# Patient Record
Sex: FEMALE | Race: WHITE
[De-identification: names, ages, dates, MRNs, and addresses within clinical notes are randomized per-mention and may not be internally consistent; named-entity substitution may affect disease eponyms.]

---

## 2017-06-27 ENCOUNTER — HOSPITAL ENCOUNTER (OUTPATIENT)
Dept: HOSPITAL 42 - ED | Age: 37
Setting detail: OBSERVATION
LOS: 2 days | Discharge: HOME | End: 2017-06-29
Attending: INTERNAL MEDICINE | Admitting: INTERNAL MEDICINE
Payer: MEDICAID

## 2017-06-27 VITALS — BODY MASS INDEX: 24 KG/M2

## 2017-06-27 DIAGNOSIS — I95.9: ICD-10-CM

## 2017-06-27 DIAGNOSIS — R42: ICD-10-CM

## 2017-06-27 DIAGNOSIS — I35.1: ICD-10-CM

## 2017-06-27 DIAGNOSIS — R55: ICD-10-CM

## 2017-06-27 DIAGNOSIS — Z87.891: ICD-10-CM

## 2017-06-27 DIAGNOSIS — G43.109: Primary | ICD-10-CM

## 2017-06-27 LAB
ALBUMIN/GLOB SERPL: 1.2 {RATIO} (ref 1.1–1.8)
ALP SERPL-CCNC: 58 U/L (ref 38–133)
ALT SERPL-CCNC: 29 U/L (ref 7–56)
APTT BLD: 27.3 SECONDS (ref 23.7–30.8)
AST SERPL-CCNC: 29 U/L (ref 15–39)
BILIRUB SERPL-MCNC: 0.5 MG/DL (ref 0.2–1.3)
BUN SERPL-MCNC: 17 MG/DL (ref 7–21)
CALCIUM SERPL-MCNC: 8.9 MG/DL (ref 8.4–10.5)
CHLORIDE SERPL-SCNC: 99 MMOL/L (ref 98–107)
CO2 SERPL-SCNC: 29 MMOL/L (ref 21–33)
ERYTHROCYTE [DISTWIDTH] IN BLOOD BY AUTOMATED COUNT: 12.7 % (ref 11.5–14.5)
GLOBULIN SER-MCNC: 3.3 GM/DL
GLUCOSE SERPL-MCNC: 89 MG/DL (ref 70–110)
HCT VFR BLD CALC: 38.4 % (ref 36–48)
INR PPP: 0.94 (ref 0.93–1.08)
MCH RBC QN AUTO: 31 PG (ref 25–35)
MCHC RBC AUTO-ENTMCNC: 33.9 G/DL (ref 31–37)
MCV RBC AUTO: 91.6 FL (ref 80–105)
PLATELET # BLD: 190 10^3/UL (ref 120–450)
PMV BLD AUTO: 11.2 FL (ref 7–11)
POTASSIUM SERPL-SCNC: 3.7 MMOL/L (ref 3.6–5)
PROT SERPL-MCNC: 7.5 G/DL (ref 5.8–8.3)
SODIUM SERPL-SCNC: 136 MMOL/L (ref 132–148)
TROPONIN I SERPL-MCNC: < 0.01 NG/ML
WBC # BLD AUTO: 8.2 10^3/UL (ref 4.5–11)

## 2017-06-27 PROCEDURE — 80048 BASIC METABOLIC PNL TOTAL CA: CPT

## 2017-06-27 PROCEDURE — 83735 ASSAY OF MAGNESIUM: CPT

## 2017-06-27 PROCEDURE — 80353 DRUG SCREENING COCAINE: CPT

## 2017-06-27 PROCEDURE — 93005 ELECTROCARDIOGRAM TRACING: CPT

## 2017-06-27 PROCEDURE — 82550 ASSAY OF CK (CPK): CPT

## 2017-06-27 PROCEDURE — 80349 CANNABINOIDS NATURAL: CPT

## 2017-06-27 PROCEDURE — 93306 TTE W/DOPPLER COMPLETE: CPT

## 2017-06-27 PROCEDURE — 83615 LACTATE (LD) (LDH) ENZYME: CPT

## 2017-06-27 PROCEDURE — 85610 PROTHROMBIN TIME: CPT

## 2017-06-27 PROCEDURE — 71010: CPT

## 2017-06-27 PROCEDURE — 85378 FIBRIN DEGRADE SEMIQUANT: CPT

## 2017-06-27 PROCEDURE — 81003 URINALYSIS AUTO W/O SCOPE: CPT

## 2017-06-27 PROCEDURE — 83992 ASSAY FOR PHENCYCLIDINE: CPT

## 2017-06-27 PROCEDURE — 71275 CT ANGIOGRAPHY CHEST: CPT

## 2017-06-27 PROCEDURE — 85730 THROMBOPLASTIN TIME PARTIAL: CPT

## 2017-06-27 PROCEDURE — 80345 DRUG SCREENING BARBITURATES: CPT

## 2017-06-27 PROCEDURE — 36415 COLL VENOUS BLD VENIPUNCTURE: CPT

## 2017-06-27 PROCEDURE — 84484 ASSAY OF TROPONIN QUANT: CPT

## 2017-06-27 PROCEDURE — 84703 CHORIONIC GONADOTROPIN ASSAY: CPT

## 2017-06-27 PROCEDURE — 80053 COMPREHEN METABOLIC PANEL: CPT

## 2017-06-27 PROCEDURE — 80346 BENZODIAZEPINES1-12: CPT

## 2017-06-27 PROCEDURE — 70450 CT HEAD/BRAIN W/O DYE: CPT

## 2017-06-27 PROCEDURE — 85027 COMPLETE CBC AUTOMATED: CPT

## 2017-06-27 PROCEDURE — 84443 ASSAY THYROID STIM HORMONE: CPT

## 2017-06-27 PROCEDURE — 80361 OPIATES 1 OR MORE: CPT

## 2017-06-27 PROCEDURE — 80324 DRUG SCREEN AMPHETAMINES 1/2: CPT

## 2017-06-27 PROCEDURE — 80358 DRUG SCREENING METHADONE: CPT

## 2017-06-27 PROCEDURE — 99285 EMERGENCY DEPT VISIT HI MDM: CPT

## 2017-06-27 PROCEDURE — 84100 ASSAY OF PHOSPHORUS: CPT

## 2017-06-27 NOTE — CT
EXAM:

  CT Head Without Intravenous Contrast



CLINICAL HISTORY:

  37 years old, female; Signs and symptoms; Dizziness; Additional info: Dizzy



TECHNIQUE:

  Axial computed tomography images of the head/brain without intravenous 

contrast.  This CT exam was performed using one or more of the following dose 

reduction techniques:  automated exposure control, adjustment of the mA and/or 

kV according to patient size, and/or use of iterative reconstruction technique.



COMPARISON:

  No relevant prior studies available.





FINDINGS:

  Brain: No acute intracranial hemorrhage.  No significant white matter 

disease.  No edema.

  Ventricles: No significant ventriculomegaly.

  Bones:  No acute displaced fracture.

  Sinuses:  Unremarkable as visualized.  No acute sinusitis.

  Mastoid air cells:  Unremarkable as visualized.  No mastoid effusion.



IMPRESSION:     

No acute intracranial hemorrhage, or suspicious mass effect.









Acute infarction may be CT occult within first 24 hours.  

If a focal deficit persists, consider followup CT or MRI for further 

evaluation.

## 2017-06-27 NOTE — ED PDOC
Arrival/HPI





- General


Time Seen by Provider: 17 21:28


Historian: Patient





- History of Present Illness


Narrative History of Present Illness (Text): 


17 21:41


Raquel Chauhan is a 37 year old female who presents to the ED complaining of 

intermittent dizziness,near fainting episodes. Patient states she began 

experiencing intermittent dizziness a few days prior while at work with 

associated near-syncopal episodes, left eye blurriness, generalized weakness, 

and head pressure. Patient notes she took Meclizine at home, but denies any 

relief. Patient denies any loss of consciousness, weakness/numbness, chest pain

, shortness of breath, nausea, diarrhea, back pain, neck pain, or any 

complaints.


Symptom Onset: Gradual


Symptom Course: Intermittent


Activities at Onset: Light


Context: Home





Past Medical History





- Provider Review


Nursing Documentation Reviewed: Yes





- Past History


Past History: No Previous





- Tetanus Immunization


Tetanus Immunization: Unknown





- Neurological


Hx Neurological Disorder: Yes


Hx Migraine: Yes





- Genitourinary/Gynecological


Hx Genitourinary Disorders: Yes


Hx Cervical Cancer: Yes (4-5 MOS AGO)


Other/Comment: multiparity





- Past Surgical History


Past Surgical History: No Previous





- Surgical History


Hx  Section: Yes


Hx Dilation and Curettage: Yes


Hx Tubal Ligation: Yes


Other/Comment: OVARIAN CYSTECTOMY, PARTIAL REMOVAL OF CERVIX DUE TO CA ?





- Anesthesia


Hx Anesthesia: Yes


Hx Anesthesia Reactions: No





- Suicidal Assessment


Feels Threatened In Home Enviroment: No





Family/Social History





- Physician Review


Nursing Documentation Reviewed: Yes


Family/Social History: Unknown Family HX


Smoking Status: Never Smoked


Hx Alcohol Use: No


Hx Substance Use Treatment: No





Allergies/Home Meds


Allergies/Adverse Reactions: 


Allergies





No Known Allergies Allergy (Verified 16 22:08)


 








Home Medications: 


 Home Meds











 Medication  Instructions  Recorded  Confirmed


 


No Known Home Med  13














Review of Systems





- Physician Review


All systems were reviewed & negative as marked: Yes





- Review of Systems


Constitutional: Other (+generalized weakness).  absent: Fevers


Eyes: Vision Changes (+left eye blurriness)


ENT: Normal


Respiratory: Normal.  absent: SOB, Cough


Cardiovascular: Other (+near-syncopal).  absent: Chest Pain


Gastrointestinal: absent: Abdominal Pain, Diarrhea, Nausea


Genitourinary Female: Normal.  absent: Dysuria, Frequency, Hematuria, Urine 

Output Changes


Musculoskeletal: Normal.  absent: Back Pain, Neck Pain


Skin: Normal


Neurological: Dizziness


Endocrine: Normal


Hemo/Lymphatic: Normal


Psychiatric: Normal





Physical Exam


Vital Signs Reviewed: Yes


Vital Signs











  Temp Pulse Resp BP Pulse Ox


 


 17 02:58   85  18  111/73  98


 


 17 00:40   80  18  104/62  99


 


 17 23:58  97.8 F  81  16  101/67  99


 


 17 21:38  98.4 F  78  18  106/69  98











Temperature: Afebrile


Blood Pressure: Normal


Pulse: Regular


Respiratory Rate: Normal


Appearance: Positive for: Well-Appearing, Non-Toxic, Comfortable


Pain Distress: None


Mental Status: Positive for: Alert and Oriented X 3





- Systems Exam


Head: Present: Atraumatic, Normocephalic


Pupils: Present: PERRL


Extroacular Muscles: Present: EOMI


Conjunctiva: Present: Normal


Ears: Present: Normal, NORMAL TM, Normal Canal.  No: Erythema, TM Bulging, Fluid

, TM Perf


Mouth: Present: Moist Mucous Membranes


Pharnyx: Present: Normal.  No: ERYTHEMA, EXUDATE, TONSILS ENLARGED, 

Peritonsilar Swelling, Uvular Deviation, Muffled/Hoarse Voice, Strider


Neck: Present: Normal Range of Motion.  No: Meningeal Signs, MIDLINE TENDERNESS

, Paraspinal Tenderness


Respiratory/Chest: Present: Clear to Auscultation, Good Air Exchange.  No: 

Respiratory Distress, Accessory Muscle Use


Cardiovascular: Present: Regular Rate and Rhythm, Normal S1, S2.  No: Murmurs


Abdomen: Present: Normal Bowel Sounds.  No: Tenderness, Distention, Peritoneal 

Signs


Back: Present: Normal Inspection.  No: CVA Tenderness, Midline Tenderness, 

Paraspinal Tenderness


Upper Extremity: Present: Normal Inspection.  No: Cyanosis, Edema


Lower Extremity: Present: Normal Inspection.  No: Edema


Neurological: Present: GCS=15, CN II-XII Intact, Speech Normal, Motor Func 

Grossly Intact, Normal Sensory Function, Normal Cerebellar Funct, Memory Normal


Skin: Present: Warm, Dry, Normal Color.  No: Rashes


Psychiatric: Present: Alert, Oriented x 3, Normal Insight, Normal Concentration





Medical Decision Making


ED Course and Treatment: 


17 21:41


Impression:


37 year old female c/o dizziness, near-syncope, and generalized weakness.





Differential Diagnosis included but are not limited to:  near-syncope





Plan:


-- CT Head w/o contrast


-- EKG


-- CXR


-- Labs, cardiac enzymes


-- Urinalysis, urine drug screen


-- IV fluids


-- Reassess and disposition





Progress Notes:


17 22:03


Reviewed EKG, NSR at 69 bpm. No ST-segment elevations or depressions, no T-wave 

inversions, normal intervals.





17 23:42


Reviewed radiology, CT Head shows: No acute intracranial hemorrhage, or 

suspicious mass effect.


CXR shows no acute processes.





17 02:21


Case discussed with medical resident on call, who is aware and agrees with 

plan. House physician notified.





17 02:23


Case discussed with Dr. Swenson, who is aware and agrees with plan. Accepts pt in 

to hospitalist service. Pt will go to remote telemetry observation for near-

syncope and intractable dizziness. 


Pt is no acute distress. Discussed results and hospital observation with pt, 

who is aware and verbalizes understanding.





- Lab Interpretations


Lab Results: 








 17 22:50 





 17 22:50 





 Lab Results





17 23:47: Urine Opiates Screen Negative, Urine Methadone Screen Negative, 

Ur Barbiturates Screen Negative, Ur Phencyclidine Scrn Negative, Ur 

Amphetamines Screen Negative, U Benzodiazepines Scrn Negative, U Oth Cocaine 

Metabols Negative, U Cannabinoids Screen Negative


17 23:41: Urine Color Yellow, Urine Appearance Clear, Urine pH 7.0, Ur 

Specific Gravity 1.015, Urine Protein Negative, Urine Glucose (UA) Negative, 

Urine Ketones Negative, Urine Blood Negative, Urine Nitrate Negative, Urine 

Bilirubin Negative, Urine Urobilinogen 1.0 H, Ur Leukocyte Esterase Negative, 

Urine HCG, Qual Negative


17 22:50: Phosphorus 4.1, Magnesium 2.0


17 22:50: WBC 8.2, RBC 4.19, Hgb 13.0, Hct 38.4, MCV 91.6, MCH 31.0, MCHC 

33.9, RDW 12.7, Plt Count 190, MPV 11.2 H


17 22:50: PT 10.2, INR 0.94, APTT 27.3


17 22:50: Sodium 136, Potassium 3.7, Chloride 99, Carbon Dioxide 29, 

Anion Gap 12, BUN 17, Creatinine 0.8, Est GFR (African Amer) > 60, Est GFR (Non-

Af Amer) > 60, Random Glucose 89, Calcium 8.9, Total Bilirubin 0.5, AST 29, ALT 

29, Alkaline Phosphatase 58, Lactate Dehydrogenase 429, Total Creatine Kinase 68

, Troponin I < 0.01, Total Protein 7.5, Albumin 4.1, Globulin 3.3, Albumin/

Globulin Ratio 1.2








I have reviewed the lab results: Yes





- RAD Interpretation


Narrative RAD Interpretations (Text): 


CT Head shows:


Brain: No acute intracranial hemorrhage. No significant white matter disease. 

No edema.


Ventricles: No significant ventriculomegaly.


Bones: No acute displaced fracture.


Sinuses: Unremarkable as visualized. No acute sinusitis.


Mastoid air cells: Unremarkable as visualized. No mastoid effusion.


IMPRESSION:


No acute intracranial hemorrhage, or suspicious mass effect.





CXR shows no acute processes.


Radiology Orders: 








17 21:46


HEAD W/O CONTRAST [CT] Stat 





17 21:47


CHEST PORTABLE [RAD] Stat 











: ED Physician, Radiologist





- EKG Interpretation


Interpreted by ED Physician: Yes


Type: 12 lead EKG





- Medication Orders


Current Medication Orders: 








Acetaminophen (Tylenol 325mg Tab)  650 mg PO Q6H PRN


   PRN Reason: Fever >100.4 F


Acetaminophen/Butalbital/Caffeine (Fioricet)  1 tab PO Q4H PRN


   PRN Reason: Headache


Albuterol Sulfate (Albuterol 0.083% Inhal Sol (2.5 Mg/3 Ml) Ud)  2.5 mg IH Q2H 

PRN


   PRN Reason: Shortness of Breath


Famotidine (Pepcid)  20 mg PO BID [facility]


   Last Admin: 17 11:41  Dose: 20 mg





Sodium Chloride (Sodium Chloride 0.9%)  1,000 mls @ 150 mls/hr IV .Q6H40M [facility]


Ibuprofen (Motrin Tab)  400 mg PO Q6H PRN


   PRN Reason: Pain, Mild (1-3)


Meclizine HCl (Antivert)  25 mg PO Q8H PRN


   PRN Reason: Dizziness


Ondansetron HCl (Zofran Inj)  4 mg IVP Q6H PRN


   PRN Reason: Nausea/Vomiting





Discontinued Medications





Acetaminophen/Butalbital/Caffeine (Fioricet)  1 tab PO ONCE [facility]


   Stop: 17 02:47


   Last Admin: 17 02:58  Dose: 1 tab





Re-Assess: MAR Pain Assessment


 Document     17 03:58  RR  (Rec: 17 04:39  RR  [id number])


     Pain Reassessment


      Is this a pain reassessment?               Yes


     Sleep


      Is patient sleeping during reassessment?   Yes


     Pain Scale Used


      Pain Scale Used                            FLACC





Sodium Chloride (Sodium Chloride 0.9%)  1,000 mls @ 100 mls/hr IV .Q10H [facility]


   Last Admin: 17 22:53  Dose: 100 mls/hr





Iohexol (Omnipaque 350 150 Ml) Confirm Administered Dose 150 ml .ROUTE .STK-MED 

ONE


   Stop: 17 17:56


Ketorolac Tromethamine (Toradol)  30 mg IVP STAT STA


   Stop: 17 10:14











- Scribe Statement


The provider has reviewed the documentation as recorded by the Yael Millard


Provider Attestation: 


All medical record entries made by the Yael were at my direction and 

personally dictated by me. I have reviewed the chart and agree that the record 

accurately reflects my personal performance of the history, physical exam, 

medical decision making, and the department course for this patient. I have 

also personally directed, reviewed, and agree with the discharge instructions 

and disposition.





Disposition/Present on Arrival





- Present on Arrival


Any Indicators Present on Arrival: No


History of DVT/PE: No


History of Uncontrolled Diabetes: No


Urinary Catheter: No


History Surgical Site Infection Following: None





- Disposition


Have Diagnosis and Disposition been Completed?: Yes


Diagnosis: 


 Near syncope, Dizziness





Disposition: HOSPITALIZED


Disposition Time: 


Patient Plan: Observation


Patient Problems: 


 Current Active Problems











Problem Status Onset


 


Dizziness Acute  


 


Near syncope Acute  











Condition: STABLE

## 2017-06-28 VITALS — RESPIRATION RATE: 18 BRPM

## 2017-06-28 LAB
APPEARANCE UR: CLEAR
BILIRUB UR-MCNC: NEGATIVE MG/DL
BUN SERPL-MCNC: 13 MG/DL (ref 7–21)
CALCIUM SERPL-MCNC: 7.9 MG/DL (ref 8.4–10.5)
CHLORIDE SERPL-SCNC: 107 MMOL/L (ref 98–107)
CO2 SERPL-SCNC: 24 MMOL/L (ref 21–33)
COLOR UR: YELLOW
ERYTHROCYTE [DISTWIDTH] IN BLOOD BY AUTOMATED COUNT: 12.8 % (ref 11.5–14.5)
GLUCOSE SERPL-MCNC: 95 MG/DL (ref 70–110)
GLUCOSE UR STRIP-MCNC: NEGATIVE MG/DL
HCT VFR BLD CALC: 34.1 % (ref 36–48)
KETONES UR STRIP-MCNC: NEGATIVE MG/DL
LEUKOCYTE ESTERASE UR-ACNC: NEGATIVE LEU/UL
MAGNESIUM SERPL-MCNC: 2 MG/DL (ref 1.7–2.2)
MCH RBC QN AUTO: 30.3 PG (ref 25–35)
MCHC RBC AUTO-ENTMCNC: 33.4 G/DL (ref 31–37)
MCV RBC AUTO: 90.7 FL (ref 80–105)
PH UR STRIP: 7 [PH] (ref 4.7–8)
PHOSPHATE SERPL-MCNC: 4.1 MG/DL (ref 2.5–4.5)
PLATELET # BLD: 157 10^3/UL (ref 120–450)
PMV BLD AUTO: 11.4 FL (ref 7–11)
POTASSIUM SERPL-SCNC: 4 MMOL/L (ref 3.6–5)
PROT UR STRIP-MCNC: NEGATIVE MG/DL
RBC # UR STRIP: NEGATIVE /UL
SODIUM SERPL-SCNC: 136 MMOL/L (ref 132–148)
SP GR UR STRIP: 1.01 (ref 1–1.03)
UROBILINOGEN UR STRIP-ACNC: 1 E.U./DL
WBC # BLD AUTO: 5.5 10^3/UL (ref 4.5–11)

## 2017-06-28 RX ADMIN — BUTALBITAL, ACETAMINOPHEN, AND CAFFEINE STA TAB: 50; 325; 40 TABLET ORAL at 02:58

## 2017-06-28 RX ADMIN — BUTALBITAL, ACETAMINOPHEN, AND CAFFEINE STA: 50; 325; 40 TABLET ORAL at 03:10

## 2017-06-28 NOTE — CARD
--------------- APPROVED REPORT --------------





EKG Measurement

Heart [id number]

[id number]

[id number]

[id number]

[id number]



<Conclusion>

Normal sinus rhythm

Normal ECG

## 2017-06-28 NOTE — CP.PCM.CON
History of Present Illness





- History of Present Illness


History of Present Illness: 


NEURO CONSULT NOTE:





17











CHIEF COMPLAINT: HEADACHE AND DIZZINESS





HPI:


THIS IS A 37 YEAR OLD WOMAN WITH HISTORY OF HEADACHES IN THE PAST AND SYNCOPAL 

EVENT WHERE SHE HAD A MRI OF BRAIN, EEG WITH HER NEUROLOGIST DR. REYNOLDS WHICH WAS 

NORMAL. SHE PRESENTS WITH DIFFUSE PRESSURE HEADACHE ASSOCIATED WITH PHOTOPHOBIA

, PHOTOPHONIA, NAUSEA WITH MILD SPINNING SENSATION OF THE ROOM AND 

LIGHTHEADEDNESS. SHE GETS BARELY 1-2 SEVERE HEADACHES PER MONTH. HER BP WAS 

NOTED TO BE SYSTOLICALLY AND DIASTOLICALLY LOW. CT HEAD WAS NEGATIVE. CURRENTLY 

HER HEADACHES ARE MUCH BETTER AND IS WALKING AROUND WITHOUT ISSUES. 





ROS: 14 POINT REVIEW OF SYMPTOMS IS NEGATIVE AS PER HPI.





ALLERGIES: NONE





SOCIAL HISTORY: NO ILLICIT DRUG USE, SMOKING, OR ETOH USE. 





FAMILY: NON CONTRIBUTORY.





MEDICATIONS: REVIEWED BY NURSE'S RECONCILIATION SHEET.





PAST MEDICAL HISTORY: HEADACHES








PHYSICAL EXAM: 





VITAL SIGNS: REVIEWED BY THE CHART





GENERAL EXAM: PATIENT SEEN IN BED, IN NO ACUTE DISTRESS MORBIDLY OBESE.





HEENT: PERRLA, EOMI, NECK SUPPLE, NO JVD, NO ADENOPATHY





CVS: S1, S2, RRR, NO MURMURS NOTED





LUNGS: CLEAR TO AUSCULTATION, NO ADVENTITIOUS SOUNDS





ABDOMEN: SOFT AND NONTENDER





EXTREMITIES: NO CLUBBING OR CYANOSIS. PP 2+ B/L





NEURO: PT IS ALERT AND ORIENTED TO PERSON, PLACE, AND YEAR. , RECALL TO 5 

MINUTES 3/3, 





SPEECH IS FLUENT WITHOUT ERRORS, 





CN II-XII INTACT, 





MOTOR EXAM: NORMAL TONE, NORMAL BULK OF MUSCLE, MOVES ALL EXTREMITIES EQUALLY, 

NO PRONATOR DRIFT SEEN.





SENSORY EXAM: LIGHT TOUCH, PIN PRICK UP TO CALVES B/L, PROPRIOCEPTION , 

VIBRATION ARE INTACT B/L





DEEP TENDON REFLEXES: 2+ THROUGHOUT.





COORDINATION: FINGER TO NOSE IS INTACT. HEEL TO GARCIA IS INTACT





GAIT: NORMAL.











LABS: REVIEWED BY THE CHART.











ASSESSMENT AND PLAN: 





THIS IS A 37 YEAR OLD WOMAN WITH HISTORY OF HEADACHES IN THE PAST AND SYNCOPAL 

EVENT WHERE SHE HAD A MRI OF BRAIN, EEG WITH HER NEUROLOGIST DR. REYNOLDS WHICH WAS 

NORMAL. SHE PRESENTS WITH DIFFUSE PRESSURE HEADACHE ASSOCIATED WITH PHOTOPHOBIA

, PHOTOPHONIA, NAUSEA WITH MILD SPINNING SENSATION OF THE ROOM AND 

LIGHTHEADEDNESS. SHE GETS BARELY 1-2 SEVERE HEADACHES PER MONTH. HER BP WAS 

NOTED TO BE SYSTOLICALLY AND DIASTOLICALLY LOW. CT HEAD WAS NEGATIVE. CURRENTLY 

HER HEADACHES ARE MUCH BETTER AND IS WALKING AROUND WITHOUT ISSUES. 


IMPRESSION: CONSTELLATION OF SYMPTOMS ARE MORE OF A MIGRAINE WITH AURA. 

DIZZINESS IS MORE RELATED TO LOW BP


1.. ASA 81 MG FOR STROKE PREVENTION





2. MONITOR ELECTROLYTES AND CORRECT ACCORDINGLY. HYDRATE.





3. FIORECET AT THE ACUTE ONSET OF HEADACHE AND FOLLOW UP WITH NEUROLOGIST AS 

OUTPATIENT. 





THANK YOU





PLEASE RECONSULT AS NECESSARY.





ISAAC WAKEFIELD MD





Past Patient History





- Tetanus Immunizations


Tetanus Immunization: Unknown





- Past Medical History & Family History


Past Medical History?: No





- Past Social History


Smoking Status: Former Smoker





- NEUROLOGICAL


Hx Neurological Disorder: Yes


Hx Dizziness: Yes


Hx Migraine: Yes





- MUSCULOSKELETAL/RHEUMATOLOGICAL


Hx Falls: No





- GENITOURINARY/GYNECOLOGICAL


Hx Genitourinary Disorders: Yes


Other/Comment: multiparity.  





- SURGICAL HISTORY


Other/Comment: OVARIAN CYSTECTOMY, PARTIAL REMOVAL OF CERVIX DUE TO CA ?





- ANESTHESIA


Hx Anesthesia: Yes


Hx Anesthesia Reactions: No





Meds


Allergies/Adverse Reactions: 


 Allergies











Allergy/AdvReac Type Severity Reaction Status Date / Time


 


No Known Allergies Allergy   Verified 16 22:08














- Medications


Medications: 


 Current Medications





Acetaminophen (Tylenol 325mg Tab)  650 mg PO Q6H PRN


   PRN Reason: Fever >100.4 F


Acetaminophen/Butalbital/Caffeine (Fioricet)  1 tab PO Q4H PRN


   PRN Reason: Headache


Albuterol Sulfate (Albuterol 0.083% Inhal Sol (2.5 Mg/3 Ml) Ud)  2.5 mg IH Q2H 

PRN


   PRN Reason: Shortness of Breath


Famotidine (Pepcid)  20 mg PO BID [facility]


   Last Admin: 17 11:41 Dose:  20 mg


Sodium Chloride (Sodium Chloride 0.9%)  1,000 mls @ 150 mls/hr IV .Q6H40M [facility]


Ibuprofen (Motrin Tab)  400 mg PO Q6H PRN


   PRN Reason: Pain, Mild (1-3)


Meclizine HCl (Antivert)  25 mg PO Q8H PRN


   PRN Reason: Dizziness


Ondansetron HCl (Zofran Inj)  4 mg IVP Q6H PRN


   PRN Reason: Nausea/Vomiting











Results





- Vital Signs


Recent Vital Signs: 


 Last Vital Signs











Temp  98.4 F   17 16:00


 


Pulse  84   17 16:00


 


Resp  18   17 16:00


 


BP  110/73   17 16:00


 


Pulse Ox  99   17 16:00














- Labs


Result Diagrams: 


 17 09:30





 17 09:30


Labs: 


 Laboratory Results - last 24 hr











  17





  09:30 09:30 12:30


 


WBC  5.5  D  


 


RBC  3.76  


 


Hgb  11.4 L  


 


Hct  34.1 L  


 


MCV  90.7  


 


MCH  30.3  


 


MCHC  33.4  


 


RDW  12.8  


 


Plt Count  157  


 


MPV  11.4 H  


 


D-Dimer, Quantitative   


 


Sodium   136 


 


Potassium   4.0 


 


Chloride   107 


 


Carbon Dioxide   24 


 


Anion Gap   9 L 


 


BUN   13 


 


Creatinine   0.7 


 


Est GFR ( Amer)   > 60 


 


Est GFR (Non-Af Amer)   > 60 


 


Random Glucose   95 


 


Calcium   7.9 L 


 


TSH 3rd Generation    2.09














  17





  15:03


 


WBC 


 


RBC 


 


Hgb 


 


Hct 


 


MCV 


 


MCH 


 


MCHC 


 


RDW 


 


Plt Count 


 


MPV 


 


D-Dimer, Quantitative  0.56 H


 


Sodium 


 


Potassium 


 


Chloride 


 


Carbon Dioxide 


 


Anion Gap 


 


BUN 


 


Creatinine 


 


Est GFR ( Amer) 


 


Est GFR (Non-Af Amer) 


 


Random Glucose 


 


Calcium 


 


TSH 3rd Generation

## 2017-06-28 NOTE — RAD
HISTORY:

dizzy  



COMPARISON:

No prior. 



FINDINGS:



LUNGS:

No active pulmonary disease.



PLEURA:

No significant pleural effusion identified, no pneumothorax apparent.



CARDIOVASCULAR:

Normal.



OSSEOUS STRUCTURES:

No significant abnormalities.



VISUALIZED UPPER ABDOMEN:

Normal.



OTHER FINDINGS:

None.



IMPRESSION:

No active disease.

## 2017-06-28 NOTE — CP.PCM.HP
<LASHANDAAshok stevens - Last Filed: 17 02:51>





History of Present Illness





- History of Present Illness


History of Present Illness: 


CC: Headache and dizziness





This patient is a 38yo F w/ no PMhx, on no medications, who is presenting to 

the ED for intermittent dizziness associated with headache, nausea, and 

photophobia. The patient states this has happened to her before, and her PMD 

prescribed her meclizine, which helped initially along with some ibuprofen. 

During the past week, the meclizine did not help her as much. Last year, the 

patient had a full neurological workup, including brain MRI, EEG, and EMG which 

was completely negative as per the patient. She has vomited once, on Friday, 

with the dizziness. She does not qualify it as blurry vision, or room spinning 

but states that her left sided vision sometimes gets a little "blurry and she 

gets a headache that is pounding". She currently denies all symptoms, of HA, CP

, SOB, abdominal pain, n/V/d, dysuria/freq/urg, or lower extremity pain, gait 

problems, and was comfortably sleeping in the bed when i went to examine her





PMhx: none


Meds: none


Surgeries: tubal ligation and DnC; used to have heavy periods and when gave 

birth to son they found a large leiomyoma and removed it 


FamHx: States both side of her family have all of the cancers, Colon, Breast, 

Ovarian, Uterine, and Lung all at 65+ years of age


Social: Employed full time, hairdresser, denies current smoking/drugs/EtOH 

socially, has 3 children, independent in all activities, walks without issue 

with no cane and has housing 


Allergies: Denies





Present on Admission





- Present on Admission


Any Indicators Present on Admission: No


History of DVT/PE: No


History of Uncontrolled Diabetes: No


Urinary Catheter: No


Decubitus Ulcer Present: No





Past Patient History





- Tetanus Immunizations


Tetanus Immunization: Unknown





- Past Medical History & Family History


Past Medical History?: No





- Past Social History


Smoking Status: Never Smoked





- NEUROLOGICAL


Hx Neurological Disorder: Yes


Hx Migraine: Yes





- GENITOURINARY/GYNECOLOGICAL


Hx Genitourinary Disorders: Yes


Hx Cervical Cancer: Yes (4-5 MOS AGO)


Other/Comment: multiparity





- SURGICAL HISTORY


Hx  Section: Yes


Hx Dilation and Curettage: Yes


Hx Tubal Ligation: Yes


Other/Comment: OVARIAN CYSTECTOMY, PARTIAL REMOVAL OF CERVIX DUE TO CA ?





- ANESTHESIA


Hx Anesthesia: Yes


Hx Anesthesia Reactions: No





Meds


Allergies/Adverse Reactions: 


 Allergies











Allergy/AdvReac Type Severity Reaction Status Date / Time


 


No Known Allergies Allergy   Verified 16 22:08














Physical Exam





- Constitutional


Appears: Well, Non-toxic


Additional comments: 


well groomed pleasant female responding appropriately to questions, resting 

comfortably in bed





- Head Exam


Head Exam: ATRAUMATIC





- Eye Exam


Eye Exam: EOMI, Normal appearance


Pupil Exam: PERRL





- ENT Exam


ENT Exam: Mucous Membranes Moist.  absent: Mucous Membranes Dry





- Neck Exam


Neck exam: Positive for: Full Rom, Normal Inspection.  Negative for: 

Lymphadenopathy, Meningismus, Tenderness, Thyromegaly





- Respiratory Exam


Respiratory Exam: Clear to Auscultation Bilateral, NORMAL BREATHING PATTERN.  

absent: Rales, Rhonchi, Wheezes





- Cardiovascular Exam


Cardiovascular Exam: REGULAR RHYTHM, RRR, +S1, +S2.  absent: Bradycardia, 

Tachycardia, Clicks, Diastolic murmur, Gallop, Irregular Rhythm, JVD, Rubs, +S4

, Systolic Murmur





- GI/Abdominal Exam


GI & Abdominal Exam: Normal Bowel Sounds, Soft.  absent: Bruit, Diminished 

Bowel Sounds, Distended, Firm, Guarding, Hyperactive Bowel Sounds, Hypoactive 

Bowel Sounds, Mass, Organomegaly, Pulsatile Mass, Rebound, Tenderness





- Rectal Exam


Rectal Exam: Deferred





- Extremities Exam


Extremities exam: Positive for: full ROM, normal capillary refill, normal 

inspection, pedal pulses present.  Negative for: calf tenderness, joint swelling

, pedal edema, tenderness





- Back Exam


Back exam: FULL ROM, NORMAL INSPECTION.  absent: CVA tenderness (L), CVA 

tenderness (R), muscle spasm, paraspinal tenderness, rash noted, tenderness, 

vertebral tenderness





- Neurological Exam


Neurological exam: Alert, CN II-XII Intact, Normal Gait, Oriented x3, Reflexes 

Normal


Additional comments: 


Neuro exam is completely normal, all cranial nerves intact, no nystagmus





- Psychiatric Exam


Psychiatric exam: Normal Affect, Normal Mood





Results





- Vital Signs


Recent Vital Signs: 





 Last Vital Signs











Temp  97.8 F   17 23:58


 


Pulse  80   17 00:40


 


Resp  18   17 00:40


 


BP  104/62   17 00:40


 


Pulse Ox  99   06/28/17 00:40














- Labs


Result Diagrams: 


 17 22:50





 17 22:50





Assessment & Plan





- Assessment and Plan (Free Text)


Assessment: 


38yo F admitted for possible migraine and near syncope 





Possible Migraine and near syncope 


-Fiorcet PRN and Meclizine; patient was given no analgesia in the ED and no 

antinausea/anti vertigo medicines in the ED


-head CT negative


-Chest X-Ray Normal


-EKG NSR


-Echo ordered, f/u results


-patient has a reported neuro workup within the past year that was completely 

negative EEG, MRI brain, and EMG studies, currently has a completely steady gait

, is resting comfortably in her room, and is responding appropriately to 

questions 


-orthostatics were negative; pt is s/p 1L NS in the ED 





Proph


Pepcid


Regular Diet


OOB encouraged





Case discussed with Dr. Messi Jonas PGY1 Night Float








Decision To Admit





- Pt Status Changed To:


Hospital Disposition Of: Observation





- .


Bed Request Type: Med/Surg


Admitting Physician: All Swenson





<All Swenson - Last Filed: 17 05:07>





Results





- Vital Signs


Recent Vital Signs: 





 Last Vital Signs











Temp  98.0 F   17 04:08


 


Pulse  65   17 04:08


 


Resp  16   17 04:08


 


BP  100/60   17 04:08


 


Pulse Ox  98   17 02:58














- Labs


Result Diagrams: 


 17 22:50





 17 22:50





Attending/Attestation





- Attestation


I have personally seen and examined this patient.: Yes


I have fully participated in the care of the patient.: Yes


I have reviewed all pertinent clinical information: Yes


Notes (Text): 





17 05:06


Patient was seen when she was in bed # 367-02.


Agree with history , physical , assessment and plan with following addendum.





37 year old woman with past medical history of no significance, felt dizzy


at her job as costmatologist, on Thursday morning, felt blurry


in left eye, felt like she was going to pass out ,felt weak, vomited once,


felt nauseated, felt drowsy and sleepy, had head ache , mostly in face ,


side of face and neck, mild headache, she had similar symptoms one year


ago when she went to [facility] she was told that she had


a mini stroke, had vertigo and was given a prescription for meclizine.





ALLERGIES:Denies.





PMH:Denies.





PAST SURGICAL HISTORY: + bilateral tubal ligation - 4 years ago.


                      Excison of ovarian cyst , biopsy revealed "close 


                      to cancer"


OB-GYN History-, one miscarriage, LMP6/15/2017.





SOCIAL HISTORY:Smoking-quit 10 years ago, smoked 1 pack per 2 weeks x 1 year (

Summer)


               ETOH-Occ.


               Drugs-Never.


FH:Many members with DM.


   Maternal Uncle-Prostate cancer.


   One cousin-Uterine cancer.





ROS:When she was 5-6 years old had an episode of passing out and had 


    dizzines, mother told her that she had food allergy.


    States that she gets nausea with some food and can't take them.


    Had injury to left ear about one year ago [place]


    could not hear well for some time.


    Has eye glasses for far vision.


    History of ear infection x 2.


    Seasonal allergies positive.


    Had anemia "Growing UP"


    Has migraines sometimes.


    History of anxiety is positive.

## 2017-06-28 NOTE — CT
PROCEDURE:  CT Chest with contrast (Pulmonary Angiogram)



HISTORY:

Rule out Pulmonary embolism



COMPARISON:

None available. 



TECHNIQUE:

Axial computed tomography images were obtained of the chest in the 

pulmonary arterial phase of enhancement. Coronal and sagittal 

reformatted images were created and reviewed.



Intravenous contrast dose: 



Radiation dose:



Total exam DLP = 325 mGy-cm.



This CT exam was performed using one or more of the following dose 

reduction techniques: Automated exposure control, adjustment of the 

mA and/or kV according to patient size, and/or use of iterative 

reconstruction technique.



Total exam DLP = 325 mGy-cm.



FINDINGS:



PULMONARY ARTERIES:

Unremarkable. No pulmonary embolism. 



AORTA:

No acute findings. No thoracic aortic aneurysm. 



LUNGS:

Unremarkable. No nodule, mass or pulmonary consolidation. 



PLEURAL SPACES:

Unremarkable. No effusion or pneuomothorax. 



HEART:

Unremarkable. No cardiomegaly. No significant pericardial effusion. 



LYMPH NODES:

No lymphadenopathy.



BONES, CHEST WALL:

Unremarkable. No fracture or destructive lesion 



OTHER FINDINGS:

Unremarkable. 



IMPRESSION:

Unremarkable CT pulmonary angiogram. No pulmonary embolus.

## 2017-06-28 NOTE — CARD
--------------- APPROVED REPORT --------------





EXAM: Two-dimensional and M-mode echocardiogram with Doppler and 

color Doppler.



INDICATION

Syncope 



2D DIMENSIONS 

Left Atrium (2D)3.0   (1.6-4.0cm)IVSd0.9   (0.7-1.1cm)

LVDd4.7   (3.9-5.9cm)PWd0.9   (0.7-1.1cm)

LVDs3.5   (2.5-4.0cm)FS (%) 25.9   %

LVEF (%)51.0   (>50%)



M-Mode DIMENSIONS 

Aortic Root2.70   (2.2-3.7cm)Aortic Cusp Exc.1.80   (1.5-2.0cm)



Aortic Valve

AoV Peak Ekfdkoui32.1cm/Sue Peak GR.4mmHg



Mitral Valve

MV E Gawjmepp73.4cm/sMV A Irfubfle43.6cm/sE/A ratio1.4



TDI

E/Lateral E'0.0E/Medial E'0.0



Tricuspid Valve

TR Peak Lvkayfcq809rg/sRAP UZKYJLYD32otFyCP Peak Gr.18mmHg

[id number]



 LEFT VENTRICLE 

The left ventricle is normal size. There is normal left ventricular 

wall thickness. Left ventricle systolic function is low normalto 

Mildlly decreased.EF-50-55% There is mild hypokinesis in the apical 

anterior wall. The left ventricular diastolic function is normal. No 

left ventricle thrombus noted on this study. There is no ventricular 

septal defect visualized. There is no left ventricular aneurysm. 

There is no mass noted in the left ventricle.



 RIGHT VENTRICLE 

The right ventricle is normal size. There is normal right ventricular 

wall thickness. The right ventricular systolic function is normal.



 ATRIA 

The left atrium size is normal. The right atrium size is normal. The 

interatrial septum is intact with no evidence for an atrial septal 

defect. The atrial septum is aneurysmal.



 AORTIC VALVE 

The aortic valve is calcified but opens well. There is trace to mild 

aortic regurgitation. There is no aortic valvular stenosis. There is 

no aortic valvular vegetation.



 MITRAL VALVE 

The mitral valve is thickened but opens well. Mitral regurgitation is 

mild to moderate. There is no mitral valve stenosis. There is no 

evidence of mitral valve prolapse.



 TRICUSPID VALVE 

The tricuspid valve leaflets are thickened , but open well. There is 

mild to moderate tricuspid regurgitation.RVSP-28 mmof hg. There is no 

tricuspid valve stenosis. There is no tricuspid valve prolapse or 

vegetation.



 PULMONIC VALVE 

The pulmonic valve is not well visualized. There is trace pulmonic 

valvular regurgitation. There is no pulmonic valvular stenosis.



 GREAT VESSELS 

The aortic root is normal in size. The ascending aorta is normal in 

size. The pulmonary artery is normal. The IVC is normal in size and 

collapses >50% with inspiration.



 PERICARDIAL EFFUSION 

There is no pleural effusion. There is no pericardial effusion.



<Conclusion>

The left ventricle is normal size.

There is normal left ventricular wall thickness.

Left ventricle systolic function is low normalto Mildlly 

decreased.EF-50-55%

There is trace to mild aortic regurgitation.

Mitral regurgitation is mild to moderate.

There is mild to moderate tricuspid regurgitation.RVSP-28 mmof hg.

The IVC is normal in size and collapses >50% with inspiration.

There is no pericardial effusion.

No Vegetation or thrombus noted.

The interatrial septum is intact with no evidence for an atrial 

septal defect.

The atrial septum is aneurysmal.

## 2017-06-29 VITALS — HEART RATE: 90 BPM

## 2017-06-29 VITALS — SYSTOLIC BLOOD PRESSURE: 99 MMHG | OXYGEN SATURATION: 97 % | DIASTOLIC BLOOD PRESSURE: 60 MMHG | TEMPERATURE: 97.7 F

## 2017-06-29 LAB
BUN SERPL-MCNC: 11 MG/DL (ref 7–21)
CALCIUM SERPL-MCNC: 8.4 MG/DL (ref 8.4–10.5)
CHLORIDE SERPL-SCNC: 105 MMOL/L (ref 98–107)
CO2 SERPL-SCNC: 26 MMOL/L (ref 21–33)
ERYTHROCYTE [DISTWIDTH] IN BLOOD BY AUTOMATED COUNT: 12.8 % (ref 11.5–14.5)
GLUCOSE SERPL-MCNC: 95 MG/DL (ref 70–110)
HCT VFR BLD CALC: 37 % (ref 36–48)
MCH RBC QN AUTO: 30.1 PG (ref 25–35)
MCHC RBC AUTO-ENTMCNC: 33 G/DL (ref 31–37)
MCV RBC AUTO: 91.4 FL (ref 80–105)
PLATELET # BLD: 165 10^3/UL (ref 120–450)
PMV BLD AUTO: 11 FL (ref 7–11)
POTASSIUM SERPL-SCNC: 4.2 MMOL/L (ref 3.6–5)
SODIUM SERPL-SCNC: 137 MMOL/L (ref 132–148)
WBC # BLD AUTO: 6.5 10^3/UL (ref 4.5–11)

## 2017-06-29 NOTE — CP.PCM.DIS
<Vic Oliveira - Last Filed: 06/29/17 17:54>





Provider





- Provider


Date of Admission: 


06/28/17 02:28





Attending physician: 


Ashwin Kerr MD





Primary care physician: 


Bianca Acosta DO





Consults: 





 





Dora Izaguirre


Time Spent in preparation of Discharge (in minutes): 45





Hospital Course





- Lab Results


Lab Results: 


 Most Recent Lab Values











WBC  6.5 10^3/ul (4.5-11.0)   06/29/17  08:00    


 


RBC  4.05 10^6/uL (3.5-6.1)   06/29/17  08:00    


 


Hgb  12.2 gm/dL (12.0-16.0)   06/29/17  08:00    


 


Hct  37.0 % (36.0-48.0)   06/29/17  08:00    


 


MCV  91.4 fL (80.0-105.0)   06/29/17  08:00    


 


MCH  30.1 pg (25.0-35.0)   06/29/17  08:00    


 


MCHC  33.0 g/dl (31.0-37.0)   06/29/17  08:00    


 


RDW  12.8 % (11.5-14.5)   06/29/17  08:00    


 


Plt Count  165 10^3/uL (120.0-450.0)   06/29/17  08:00    


 


MPV  11.0 fl (7.0-11.0)   06/29/17  08:00    


 


PT  10.2 Seconds (9.9-11.8)   06/27/17  22:50    


 


INR  0.94  (0.93-1.08)   06/27/17  22:50    


 


APTT  27.3 Seconds (23.7-30.8)   06/27/17  22:50    


 


D-Dimer, Quantitative  0.56 mg/L FEU (0-0.50)  H  06/28/17  15:03    


 


Sodium  137 mmol/L (132-148)   06/29/17  08:00    


 


Potassium  4.2 mmol/L (3.6-5.0)   06/29/17  08:00    


 


Chloride  105 mmol/L ()   06/29/17  08:00    


 


Carbon Dioxide  26 mmol/L (21-33)   06/29/17  08:00    


 


Anion Gap  10  (10-20)   06/29/17  08:00    


 


BUN  11 mg/dL (7-21)   06/29/17  08:00    


 


Creatinine  0.8 mg/dL (0.5-1.4)   06/29/17  08:00    


 


Est GFR ( Amer)  > 60   06/29/17  08:00    


 


Est GFR (Non-Af Amer)  > 60   06/29/17  08:00    


 


Random Glucose  95 mg/dL ()   06/29/17  08:00    


 


Calcium  8.4 mg/dL (8.4-10.5)   06/29/17  08:00    


 


Phosphorus  4.1 mg/dL (2.5-4.5)   06/27/17  22:50    


 


Magnesium  2.0 mg/dL (1.7-2.2)   06/27/17  22:50    


 


Total Bilirubin  0.5 mg/dL (0.2-1.3)   06/27/17  22:50    


 


AST  29 U/L (15-39)   06/27/17  22:50    


 


ALT  29 U/L (7-56)   06/27/17  22:50    


 


Alkaline Phosphatase  58 U/L ()   06/27/17  22:50    


 


Lactate Dehydrogenase  429 U/L (333-699)   06/27/17  22:50    


 


Total Creatine Kinase  68 U/L ()   06/27/17  22:50    


 


Troponin I  < 0.01 ng/mL  06/27/17  22:50    


 


Total Protein  7.5 g/dL (5.8-8.3)   06/27/17  22:50    


 


Albumin  4.1 g/dL (3.0-4.8)   06/27/17  22:50    


 


Globulin  3.3 gm/dL  06/27/17  22:50    


 


Albumin/Globulin Ratio  1.2  (1.1-1.8)   06/27/17  22:50    


 


TSH 3rd Generation  2.09 mIU/mL (0.46-4.68)   06/28/17  12:30    


 


Urine Color  Yellow  (YELLOW)   06/27/17  23:41    


 


Urine Appearance  Clear  (CLEAR)   06/27/17  23:41    


 


Urine pH  7.0  (4.7-8.0)   06/27/17  23:41    


 


Ur Specific Gravity  1.015  (1.005-1.035)   06/27/17  23:41    


 


Urine Protein  Negative mg/dL (<30 mg/dL)   06/27/17  23:41    


 


Urine Glucose (UA)  Negative mg/dL (NEGATIVE)   06/27/17  23:41    


 


Urine Ketones  Negative mg/dL (NEGATIVE)   06/27/17  23:41    


 


Urine Blood  Negative  (NEGATIVE)   06/27/17  23:41    


 


Urine Nitrate  Negative  (NEGATIVE)   06/27/17  23:41    


 


Urine Bilirubin  Negative  (NEGATIVE)   06/27/17  23:41    


 


Urine Urobilinogen  1.0 E.U./dL (<1 E.U./dL)  H  06/27/17  23:41    


 


Ur Leukocyte Esterase  Negative Yary/uL (NEGATIVE)   06/27/17  23:41    


 


Urine HCG, Qual  Negative  (NEGATIVE)   06/27/17  23:41    


 


Urine Opiates Screen  Negative  (NEGATIVE)   06/27/17  23:47    


 


Urine Methadone Screen  Negative  (NEGATIVE)   06/27/17  23:47    


 


Ur Barbiturates Screen  Negative  (NEGATIVE)   06/27/17  23:47    


 


Ur Phencyclidine Scrn  Negative  (NEGATIVE)   06/27/17  23:47    


 


Ur Amphetamines Screen  Negative  (NEGATIVE)   06/27/17  23:47    


 


U Benzodiazepines Scrn  Negative  (NEGATIVE)   06/27/17  23:47    


 


U Oth Cocaine Metabols  Negative  (NEGATIVE)   06/27/17  23:47    


 


U Cannabinoids Screen  Negative  (NEGATIVE)   06/27/17  23:47    














- Hospital Course


Hospital Course: 











Attending: Adia








Admit date- 6/28





DC date- 6/29








Consults





Dora Izaguirre DC diagnoses





1. migraine 


2. dizziness





Procedures- none


No complications











HPI: see h/p








Labs: see lab data








Hospital course 





This is a 37 year old female admitted for possible migraine and near syncope 





Possible Migraine and near syncope 


-Fiorcet PRN and Meclizine; patient was given no analgesia in the ED and no 

antinausea/anti vertigo medicines in the ED


-head CT negative


-Chest X-Ray Normal


-EKG NSR


-Echo shows some mild aortic regurgitation


-patient has a reported neuro workup within the past year that was completely 

negative EEG, MRI brain, and EMG studies, currently has a completely steady gait

, is resting comfortably in her room, and is responding appropriately to 

questions 


-orthostatics were negative; pt is s/p 1L NS in the ED 


-pt had elevated d dimer 


-cta negative for PE


-cleared by neuro for discharge





Ppx


Pepcid


Regular Diet


OOB encouraged














DC meds








1. fioricet.


2. meclizine 














DC instructions





Please return if condition worsens. Please dc home. Please f/u with PMD and 

neurology as needed. 





- Date & Time of H&P


Date of H&P: 06/28/17


Time of H&P: 02:51





Discharge Exam





- Head Exam


Head Exam: ATRAUMATIC, NORMAL INSPECTION, NORMOCEPHALIC





- Eye Exam


Eye Exam: EOMI





- ENT Exam


ENT Exam: Mucous Membranes Moist





- Neck Exam


Neck exam: Full Rom, Normal Inspection





- Respiratory Exam


Respiratory Exam: NORMAL BREATHING PATTERN





- Cardiovascular Exam


Cardiovascular Exam: +S1, +S2





- GI/Abdominal Exam


GI & Abdominal Exam: Normal Bowel Sounds





- Extremities Exam


Extremities exam: full ROM, normal inspection





- Back Exam


Back exam: NORMAL INSPECTION





- Neurological Exam


Neurological exam: Alert, Oriented x3





- Psychiatric Exam


Psychiatric exam: Normal Affect, Normal Mood





- Skin


Skin Exam: Dry, Intact, Normal Color, Warm





Discharge Plan





- Discharge Medications


Prescriptions: 


Acetaminophen/Butalbital/Caf [Fioricet] 1 tab PO Q4H PRN #60 tab


 PRN Reason: Headache


Meclizine [Meclizine*] 25 mg PO Q8H PRN #60 tab


 PRN Reason: Dizziness





- Follow Up Plan


Condition: STABLE


Disposition: HOME/ ROUTINE


Instructions:  Migraine Headache (DC), Migraine Headache (GEN), Vertigo (DC), 

Vertigo (GEN), Near Syncope (ED)


Additional Instructions: 


IF YOU EXPERIENCE WORSENING OF SYMPTOMS, GO TO THE ER.


FOLLOW UP WITH DOCTOR WITHIN ONE WEEK.


Referrals: 


Afshin Izaguirre MD [Staff Provider] - 





<Adia EISENBERG,[facility] - Last Filed: 06/30/17 11:53>





Provider





- Provider


Date of Admission: 


06/28/17 02:28





Attending physician: 


Ashwin Kerr MD





Primary care physician: 


Bianca Acosta DO








Hospital Course





- Lab Results


Lab Results: 


 Most Recent Lab Values











WBC  6.5 10^3/ul (4.5-11.0)   06/29/17  08:00    


 


RBC  4.05 10^6/uL (3.5-6.1)   06/29/17  08:00    


 


Hgb  12.2 gm/dL (12.0-16.0)   06/29/17  08:00    


 


Hct  37.0 % (36.0-48.0)   06/29/17  08:00    


 


MCV  91.4 fL (80.0-105.0)   06/29/17  08:00    


 


MCH  30.1 pg (25.0-35.0)   06/29/17  08:00    


 


MCHC  33.0 g/dl (31.0-37.0)   06/29/17  08:00    


 


RDW  12.8 % (11.5-14.5)   06/29/17  08:00    


 


Plt Count  165 10^3/uL (120.0-450.0)   06/29/17  08:00    


 


MPV  11.0 fl (7.0-11.0)   06/29/17  08:00    


 


PT  10.2 Seconds (9.9-11.8)   06/27/17  22:50    


 


INR  0.94  (0.93-1.08)   06/27/17  22:50    


 


APTT  27.3 Seconds (23.7-30.8)   06/27/17  22:50    


 


D-Dimer, Quantitative  0.56 mg/L FEU (0-0.50)  H  06/28/17  15:03    


 


Sodium  137 mmol/L (132-148)   06/29/17  08:00    


 


Potassium  4.2 mmol/L (3.6-5.0)   06/29/17  08:00    


 


Chloride  105 mmol/L ()   06/29/17  08:00    


 


Carbon Dioxide  26 mmol/L (21-33)   06/29/17  08:00    


 


Anion Gap  10  (10-20)   06/29/17  08:00    


 


BUN  11 mg/dL (7-21)   06/29/17  08:00    


 


Creatinine  0.8 mg/dL (0.5-1.4)   06/29/17  08:00    


 


Est GFR ( Amer)  > 60   06/29/17  08:00    


 


Est GFR (Non-Af Amer)  > 60   06/29/17  08:00    


 


Random Glucose  95 mg/dL ()   06/29/17  08:00    


 


Calcium  8.4 mg/dL (8.4-10.5)   06/29/17  08:00    


 


Phosphorus  4.1 mg/dL (2.5-4.5)   06/27/17  22:50    


 


Magnesium  2.0 mg/dL (1.7-2.2)   06/27/17  22:50    


 


Total Bilirubin  0.5 mg/dL (0.2-1.3)   06/27/17  22:50    


 


AST  29 U/L (15-39)   06/27/17  22:50    


 


ALT  29 U/L (7-56)   06/27/17  22:50    


 


Alkaline Phosphatase  58 U/L ()   06/27/17  22:50    


 


Lactate Dehydrogenase  429 U/L (333-699)   06/27/17  22:50    


 


Total Creatine Kinase  68 U/L ()   06/27/17  22:50    


 


Troponin I  < 0.01 ng/mL  06/27/17  22:50    


 


Total Protein  7.5 g/dL (5.8-8.3)   06/27/17  22:50    


 


Albumin  4.1 g/dL (3.0-4.8)   06/27/17  22:50    


 


Globulin  3.3 gm/dL  06/27/17  22:50    


 


Albumin/Globulin Ratio  1.2  (1.1-1.8)   06/27/17  22:50    


 


TSH 3rd Generation  2.09 mIU/mL (0.46-4.68)   06/28/17  12:30    


 


Urine Color  Yellow  (YELLOW)   06/27/17  23:41    


 


Urine Appearance  Clear  (CLEAR)   06/27/17  23:41    


 


Urine pH  7.0  (4.7-8.0)   06/27/17  23:41    


 


Ur Specific Gravity  1.015  (1.005-1.035)   06/27/17  23:41    


 


Urine Protein  Negative mg/dL (<30 mg/dL)   06/27/17  23:41    


 


Urine Glucose (UA)  Negative mg/dL (NEGATIVE)   06/27/17  23:41    


 


Urine Ketones  Negative mg/dL (NEGATIVE)   06/27/17  23:41    


 


Urine Blood  Negative  (NEGATIVE)   06/27/17  23:41    


 


Urine Nitrate  Negative  (NEGATIVE)   06/27/17  23:41    


 


Urine Bilirubin  Negative  (NEGATIVE)   06/27/17  23:41    


 


Urine Urobilinogen  1.0 E.U./dL (<1 E.U./dL)  H  06/27/17  23:41    


 


Ur Leukocyte Esterase  Negative Yary/uL (NEGATIVE)   06/27/17  23:41    


 


Urine HCG, Qual  Negative  (NEGATIVE)   06/27/17  23:41    


 


Urine Opiates Screen  Negative  (NEGATIVE)   06/27/17  23:47    


 


Urine Methadone Screen  Negative  (NEGATIVE)   06/27/17  23:47    


 


Ur Barbiturates Screen  Negative  (NEGATIVE)   06/27/17  23:47    


 


Ur Phencyclidine Scrn  Negative  (NEGATIVE)   06/27/17  23:47    


 


Ur Amphetamines Screen  Negative  (NEGATIVE)   06/27/17  23:47    


 


U Benzodiazepines Scrn  Negative  (NEGATIVE)   06/27/17  23:47    


 


U Oth Cocaine Metabols  Negative  (NEGATIVE)   06/27/17  23:47    


 


U Cannabinoids Screen  Negative  (NEGATIVE)   06/27/17  23:47    














Attending/Attestation





- Attestation


I have personally seen and examined this patient.: Yes


I have fully participated in the care of the patient.: Yes


I have reviewed all pertinent clinical information, including history, physical 

exam and plan: Yes


Notes (Text): 





06/30/17 11:47





Patient was seen and examined with medical resident .Agreed with resident 

assessment and plan.





 37 yrs old female with PMH chronic headache and one episode of syncope , had 

extensive work up ,MRI of Brain, EEG which were normal. wasa dmitted with 

headache and presyncope, was found to be hypotensive, CT head was negative, was 

given IV fluid bolus, blood pressure improved, headache has also 

improved.Patient D dimmer was elevated.CT angio chest was negative for 

Pulmonary embolism.2 D echo reveals normal systolic function , mild to moderate 

MR.Patient is asymptomatic.Patient is ambulatory.She will be discharged home 

and will follow up with PCP.





Management plan was discussed in detail with patient


 Education was provided.

## 2017-06-29 NOTE — CP.PCM.PN
Subjective





- Date & Time of Evaluation


Date of Evaluation: 06/29/17


Time of Evaluation: 10:48





- Subjective


Subjective: 





no dizziness








Objective





- Vital Signs/Intake and Output


Vital Signs (last 24 hours): 


 











Temp Pulse Resp BP Pulse Ox


 


 97.7 F   74   18   99/60 L  97 


 


 06/29/17 08:17  06/29/17 08:17  06/29/17 08:17  06/29/17 08:17  06/29/17 08:17








Intake and Output: 


 











 06/29/17 06/29/17





 06:59 18:59


 


Intake Total 780 


 


Balance 780 














- Medications


Medications: 


 Current Medications





Acetaminophen (Tylenol 325mg Tab)  650 mg PO Q6H PRN


   PRN Reason: Fever >100.4 F


Acetaminophen/Butalbital/Caffeine (Fioricet)  1 tab PO Q4H PRN


   PRN Reason: Headache


Albuterol Sulfate (Albuterol 0.083% Inhal Sol (2.5 Mg/3 Ml) Ud)  2.5 mg IH Q2H 

PRN


   PRN Reason: Shortness of Breath


Famotidine (Pepcid)  20 mg PO BID Formerly [facility]


   Last Admin: 06/29/17 09:32 Dose:  20 mg


Sodium Chloride (Sodium Chloride 0.9%)  1,000 mls @ 150 mls/hr IV .Q6H40M Former[facility], [facility]


   Last Admin: 06/29/17 02:27 Dose:  150 mls/hr


Ibuprofen (Motrin Tab)  400 mg PO Q6H PRN


   PRN Reason: Pain, Mild (1-3)


Meclizine HCl (Antivert)  25 mg PO Q8H PRN


   PRN Reason: Dizziness


Ondansetron HCl (Zofran Inj)  4 mg IVP Q6H PRN


   PRN Reason: Nausea/Vomiting











- Labs


Labs: 


 





 06/29/17 08:00 





 06/29/17 08:00 





 











PT  10.2 Seconds (9.9-11.8)   06/27/17  22:50    


 


INR  0.94  (0.93-1.08)   06/27/17  22:50    


 


APTT  27.3 Seconds (23.7-30.8)   06/27/17  22:50    














- Head Exam


Head Exam: ATRAUMATIC, NORMAL INSPECTION, NORMOCEPHALIC





- Neck Exam


Neck Exam: Normal Inspection





- Respiratory Exam


Respiratory Exam: NORMAL BREATHING PATTERN





- Cardiovascular Exam


Cardiovascular Exam: REGULAR RHYTHM





- Extremities Exam


Extremities Exam: Normal Inspection (No reported arrhythmias)





Assessment and Plan





- Assessment and Plan (Free Text)


Assessment: 





Recurrent dizzines


H/O recent ear infection


CT Angio no PE


ECho aneurysmal atrial septum, Mild to moderate MR, EF 50-55%





Plan: 


ASA 81 mg daily, F/U Echo in 2 years period

## 2017-08-27 ENCOUNTER — HOSPITAL ENCOUNTER (EMERGENCY)
Dept: HOSPITAL 42 - ED | Age: 37
Discharge: HOME | End: 2017-08-27
Payer: MEDICAID

## 2017-08-27 VITALS
RESPIRATION RATE: 18 BRPM | DIASTOLIC BLOOD PRESSURE: 82 MMHG | HEART RATE: 76 BPM | OXYGEN SATURATION: 99 % | SYSTOLIC BLOOD PRESSURE: 120 MMHG

## 2017-08-27 VITALS — BODY MASS INDEX: 24 KG/M2

## 2017-08-27 VITALS — TEMPERATURE: 98.2 F

## 2017-08-27 DIAGNOSIS — R07.9: Primary | ICD-10-CM

## 2017-08-27 PROCEDURE — 96372 THER/PROPH/DIAG INJ SC/IM: CPT

## 2017-08-27 PROCEDURE — 93005 ELECTROCARDIOGRAM TRACING: CPT

## 2017-08-27 PROCEDURE — 71020: CPT

## 2017-08-27 PROCEDURE — 99283 EMERGENCY DEPT VISIT LOW MDM: CPT

## 2017-08-27 NOTE — ED PDOC
Arrival/HPI





- General


Chief Complaint: Chest Pain


Time Seen by Provider: 17 20:41


Historian: Patient





- History of Present Illness


Narrative History of Present Illness (Text): 


17 21:09


A 37 year old female presents to the emergency department complaining of a 

cough and chest pain for the past week. Patient reports pain is diffuse across 

chest. Patient reports a productive cough with yellow sputum. Patient denies 

any other complaints at this time.





Time/Duration: 1 week


Symptom Onset: Sudden


Symptom Course: Unchanged


Activities at Onset: Rest


Context: Home





Past Medical History





- Provider Review


Nursing Documentation Reviewed: Yes





- Past History


Past History: No Previous





- Infectious Disease


Hx of Infectious Diseases: None





- Tetanus Immunization


Tetanus Immunization: Unknown





- Reproductive


Menopause: No





- Cardiac


Other/Comment: leaking valve dx 2-3 wks ago





- Neurological


Hx Neurological Disorder: Yes


Hx Migraine: Yes





- Musculoskeletal/Rheumatological


Hx Falls: No





- Genitourinary/Gynecological


Hx Genitourinary Disorders: Yes


Hx Cervical Cancer: Yes (4-5 MOS AGO)


Other/Comment: multiparity





- Psychiatric


Hx Substance Use: No





- Past Surgical History


Past Surgical History: No Previous





- Surgical History


Hx  Section: Yes


Hx Dilation and Curettage: Yes


Hx Tubal Ligation: Yes


Other/Comment: OVARIAN CYSTECTOMY, PARTIAL REMOVAL OF CERVIX DUE TO CA ?





- Anesthesia


Hx Anesthesia: Yes


Hx Anesthesia Reactions: No


Hx Malignant Hyperthermia: No





- Suicidal Assessment


Feels Threatened In Home Enviroment: No





Family/Social History





- Physician Review


Nursing Documentation Reviewed: Yes


Family/Social History: No Known Family HX


Smoking Status: Never Smoked


Hx Alcohol Use: No


Hx Substance Use: No


Hx Substance Use Treatment: No





Allergies/Home Meds


Allergies/Adverse Reactions: 


Allergies





No Known Allergies Allergy (Verified 17 20:41)


 











Review of Systems





- Physician Review


All systems were reviewed & negative as marked: Yes





- Review of Systems


Constitutional: absent: Fevers


Respiratory: Cough


Cardiovascular: Chest Pain


Neurological: absent: Headache, Dizziness





Physical Exam


Vital Signs Reviewed: Yes


Vital Signs











  Temp Pulse Resp BP Pulse Ox


 


 17 20:35  98.2 F  78  19  121/83  98











Temperature: Afebrile


Blood Pressure: Normal


Pulse: Regular


Respiratory Rate: Normal


Appearance: Positive for: Well-Appearing, Non-Toxic, Comfortable


Pain Distress: None


Mental Status: Positive for: Alert and Oriented X 3





- Systems Exam


Head: Present: Atraumatic, Normocephalic


Pupils: Present: PERRL


Extroacular Muscles: Present: EOMI


Conjunctiva: Present: Normal


Mouth: Present: Moist Mucous Membranes


Neck: Present: Normal Range of Motion


Respiratory/Chest: Present: Clear to Auscultation, Good Air Exchange.  No: 

Respiratory Distress, Accessory Muscle Use


Cardiovascular: Present: Regular Rate and Rhythm, Normal S1, S2.  No: Murmurs


Abdomen: Present: Normal Bowel Sounds.  No: Tenderness, Distention, Peritoneal 

Signs


Back: Present: Normal Inspection


Upper Extremity: Present: Normal Inspection.  No: Cyanosis, Edema


Lower Extremity: Present: Normal Inspection.  No: Edema


Neurological: Present: GCS=15, CN II-XII Intact, Speech Normal


Skin: Present: Warm, Dry, Normal Color.  No: Rashes


Psychiatric: Present: Alert, Oriented x 3, Normal Insight, Normal Concentration





Medical Decision Making


ED Course and Treatment: 


17 21:08


Impression:


A 37 year old female with chest pain and cough.  





Plan:


-- EKG


-- chest xray


-- Toradol


-- Reassess and disposition





Prior Visits:


Notes and results from previous visits were reviewed. Patient was last seen in 

the emergency department on 17 for evaluation of dizziness. 





Progress Notes:


EKG:


Ordered, reviewed, and independently interpreted the EKG.


Rate :  88 BPM


Rhythm : NSR


Interpretation : No ST/T wave changes





Patient is perc negative.





17 21:55


pt reassesd: pain improved cxr neg as read  by me. perc neg. pt speaking full 

sentences. in nad. stable for outpt managment. 





- RAD Interpretation


Radiology Orders: 








17 21:01


CXR [CHEST TWO VIEWS (PA/LAT)] [RAD] Stat 














- EKG Interpretation


Interpreted by ED Physician: Yes


Type: 12 lead EKG





- Medication Orders


Current Medication Orders: 











Discontinued Medications





Ketorolac Tromethamine (Toradol)  30 mg IM STAT STA


   Stop: 17 21:03


   Last Admin: 17 21:20  Dose: 30 mg





Ketorolac Tromethamine (Toradol) Confirm Administered Dose 30 mg .ROUTE .STK-

MED ONE


   Stop: 17 21:19


   Last Admin: 17 21:21  Dose:  











- Scribe Statement


The provider has reviewed the documentation as recorded by the Yael Almeida





Provider Scribe Attestation:


All medical record entries made by the Bingibsudhakar were at my direction and 

personally dictated by me. I have reviewed the chart and agree that the record 

accurately reflects my personal performance of the history, physical exam, 

medical decision making, and the department course for this patient. I have 

also personally directed, reviewed, and agree with the discharge instructions 

and disposition.











Disposition/Present on Arrival





- Present on Arrival


Any Indicators Present on Arrival: No


History of DVT/PE: No


History of Uncontrolled Diabetes: No


Urinary Catheter: No


History of Decub. Ulcer: No


History Surgical Site Infection Following: None





- Disposition


Have Diagnosis and Disposition been Completed?: Yes


Diagnosis: 


 Chest pain





Disposition: HOME/ ROUTINE


Disposition Time: 21:56


Condition: STABLE


Discharge Instructions (ExitCare):  Chest Pain (ED)


Additional Instructions: 


please follow up with your doctor/specialist. return to emergency room with 

worsening symptoms or concerns. 


Prescriptions: 


Naproxen 500 mg PO BID PRN #14 tab


 PRN Reason: Pain, Mild (1-3)


Referrals: 


[facility] [Outside] - Follow up with primary


[facility] [Outside] - Follow up with primary


[facility] [Outside] - Follow up with primary


Winston Cunha MD [Staff Provider] - Follow up with primary


Forms:  [email or web address] (English)

## 2017-08-28 NOTE — RAD
HISTORY:

Chest pain



COMPARISON:

No prior.



TECHNIQUE:

Chest PA and lateral



FINDINGS:



LUNGS:

The lungs are well inflated and clear.



PLEURA:

No significant pleural effusion identified. No pneumothorax apparent.



CARDIOVASCULAR:

Normal.



OSSEOUS STRUCTURES:

No significant abnormalities.



VISUALIZED UPPER ABDOMEN:

Normal.



OTHER FINDINGS:

None.



IMPRESSION:

No active pulmonary disease.

## 2017-08-28 NOTE — CARD
--------------- APPROVED REPORT --------------





EKG Measurement

Heart [id number]

[id number]

[id number]

[id number]

[id number]



<Conclusion>

Normal sinus rhythm

Normal ECG

## 2018-03-10 ENCOUNTER — HOSPITAL ENCOUNTER (EMERGENCY)
Dept: HOSPITAL 42 - ED | Age: 38
Discharge: LEFT BEFORE DISCHARGE | End: 2018-03-10
Payer: MEDICAID

## 2018-03-10 VITALS
OXYGEN SATURATION: 99 % | DIASTOLIC BLOOD PRESSURE: 78 MMHG | SYSTOLIC BLOOD PRESSURE: 116 MMHG | TEMPERATURE: 98.1 F | HEART RATE: 87 BPM | RESPIRATION RATE: 18 BRPM

## 2018-03-10 VITALS — BODY MASS INDEX: 27.4 KG/M2

## 2018-03-10 DIAGNOSIS — I38: ICD-10-CM

## 2018-03-10 DIAGNOSIS — R07.9: Primary | ICD-10-CM

## 2018-03-10 LAB
ALBUMIN SERPL-MCNC: 4.3 G/DL (ref 3–4.8)
ALBUMIN/GLOB SERPL: 1.3 {RATIO} (ref 1.1–1.8)
ALT SERPL-CCNC: 32 U/L (ref 7–56)
AST SERPL-CCNC: 35 U/L (ref 14–36)
BASOPHILS # BLD AUTO: 0.03 K/MM3 (ref 0–2)
BASOPHILS NFR BLD: 0.3 % (ref 0–3)
BUN SERPL-MCNC: 16 MG/DL (ref 7–21)
CALCIUM SERPL-MCNC: 9.6 MG/DL (ref 8.4–10.5)
EOSINOPHIL # BLD: 0.2 10*3/UL (ref 0–0.7)
EOSINOPHIL NFR BLD: 1.9 % (ref 1.5–5)
ERYTHROCYTE [DISTWIDTH] IN BLOOD BY AUTOMATED COUNT: 12.7 % (ref 11.5–14.5)
GFR NON-AFRICAN AMERICAN: > 60
GRANULOCYTES # BLD: 7.6 10*3/UL (ref 1.4–6.5)
GRANULOCYTES NFR BLD: 74.1 % (ref 50–68)
HGB BLD-MCNC: 12.7 G/DL (ref 12–16)
LYMPHOCYTES # BLD: 1.8 10*3/UL (ref 1.2–3.4)
LYMPHOCYTES NFR BLD AUTO: 17.8 % (ref 22–35)
MCH RBC QN AUTO: 30.4 PG (ref 25–35)
MCHC RBC AUTO-ENTMCNC: 33.4 G/DL (ref 31–37)
MCV RBC AUTO: 90.9 FL (ref 80–105)
MONOCYTES # BLD AUTO: 0.6 10*3/UL (ref 0.1–0.6)
MONOCYTES NFR BLD: 5.9 % (ref 1–6)
PLATELET # BLD: 202 10^3/UL (ref 120–450)
PMV BLD AUTO: 11.4 FL (ref 7–11)
RBC # BLD AUTO: 4.18 10^6/UL (ref 3.5–6.1)
TROPONIN I SERPL-MCNC: < 0.01 NG/ML
WBC # BLD AUTO: 10.3 10^3/UL (ref 4.5–11)

## 2018-03-10 NOTE — ED PDOC
Arrival/HPI





- General


Chief Complaint: Chest Pain


Time Seen by Provider: 03/10/18 20:45


Historian: Patient





- History of Present Illness


Narrative History of Present Illness (Text): 





03/10/18 21:08


This 39 yo female with pmh heart valve regurgitation, presents to this ED c/o 

dizziness, right sided head pressure, chest pressure, sob and tingling of right 

hand x PTA.  Patient stated she felt very dizzy with palpitation.  Patient 

stated she was admitted for similar symptoms in the past.  Patient denies 

diplopia, dysarthria, dysphagia, cough, recent illness, skin rash, recent travel

, or abnormal gait.








Time/Duration: Other (see hpi)


Context: Home





Past Medical History





- Provider Review


Nursing Documentation Reviewed: Yes





- Past History


Past History: No Previous





- Infectious Disease


Hx of Infectious Diseases: None





- Tetanus Immunization


Tetanus Immunization: Unknown





- Cardiac


Other/Comment: "leaky valves"





- Neurological


Hx Neurological Disorder: Yes


Hx Migraine: Yes





- Musculoskeletal/Rheumatological


Hx Falls: No





- Genitourinary/Gynecological


Hx Genitourinary Disorders: Yes


Hx Cervical Cancer: Yes (4-5 MOS AGO)


Other/Comment: multiparity





- Psychiatric


Hx Substance Use: No





- Past Surgical History


Past Surgical History: No Previous





- Surgical History


Hx  Section: Yes


Hx Dilation and Curettage: Yes


Hx Tubal Ligation: Yes


Other/Comment: OVARIAN CYSTECTOMY, PARTIAL REMOVAL OF CERVIX DUE TO CA ?





- Anesthesia


Hx Anesthesia: Yes


Hx Anesthesia Reactions: No


Hx Malignant Hyperthermia: No





- Suicidal Assessment


Feels Threatened In Home Enviroment: No





Family/Social History





- Physician Review


Nursing Documentation Reviewed: Yes


Family/Social History: Other (noncontributory)


Smoking Status: Never Smoked


Hx Alcohol Use: No


Hx Substance Use: No


Hx Substance Use Treatment: No





Allergies/Home Meds


Allergies/Adverse Reactions: 


Allergies





No Known Allergies Allergy (Verified 17 20:41)


 








Home Medications: 


 Home Meds











 Medication  Instructions  Recorded  Confirmed


 


Aspirin [Aspirin Chewable] 1 tab PO PRN PRN 03/10/18 03/10/18














Review of Systems





- Review of Systems


Constitutional: Normal.  absent: Fatigue, Weight Change, Fevers


Eyes: Normal


ENT: Normal


Respiratory: Normal.  absent: SOB, Cough


Cardiovascular: Chest Pain, Palpitations, Other (see hpi).  absent: Edema, Calf 

Pain, HICKMAN, Orthopnea, Syncope


Gastrointestinal: Normal.  absent: Abdominal Pain, Nausea, Vomiting


Genitourinary Female: Normal.  absent: Dysuria, Frequency, Vaginal Bleeding, 

Vaginal Discharge


Musculoskeletal: Normal.  absent: Arthralgias, Back Pain, Neck Pain


Skin: Normal.  absent: Rash


Neurological: Headache, Dizziness.  absent: Focal Weakness, Gait Changes, 

Speech Changes, Facial Droop, Disequilibrium, Seizure


Endocrine: Normal


Hemo/Lymphatic: Normal


Psychiatric: Normal





Physical Exam


Vital Signs











  Temp Pulse Resp BP Pulse Ox


 


 03/10/18 21:03  98.1 F  87  18  116/78  99











Temperature: Afebrile


Blood Pressure: Normal


Pulse: Regular


Respiratory Rate: Normal


Appearance: Positive for: Well-Appearing, Non-Toxic, Comfortable


Pain Distress: None


Mental Status: Positive for: Alert and Oriented X 3





- Systems Exam


Head: Present: Atraumatic, Normocephalic


Pupils: Present: PERRL


Extroacular Muscles: Present: EOMI


Conjunctiva: Present: Normal


Mouth: Present: Moist Mucous Membranes


Neck: Present: Normal Range of Motion


Respiratory/Chest: Present: Clear to Auscultation, Good Air Exchange.  No: 

Respiratory Distress, Accessory Muscle Use


Cardiovascular: Present: Regular Rate and Rhythm, Normal S1, S2.  No: Murmurs


Abdomen: Present: Normal Bowel Sounds.  No: Tenderness, Distention, Peritoneal 

Signs, Rebound, Guarding


Back: Present: Normal Inspection.  No: CVA Tenderness


Upper Extremity: Present: Normal Inspection.  No: Cyanosis, Edema


Lower Extremity: Present: Normal Inspection.  No: Edema


Neurological: Present: GCS=15, CN II-XII Intact, Speech Normal


Skin: Present: Warm, Dry, Normal Color.  No: Rashes


Psychiatric: Present: Alert, Oriented x 3, Normal Insight, Normal Concentration





Medical Decision Making


ED Course and Treatment: 





03/10/18 22:33


Patient stated she feels better.  I recommended patient to stay in the hospital 

for observation.  I asked patient if she agrees with observation/admission.  

She stated she prefers to go home and she wants to leave AMA.  She stated she 

sees a cardiologist at [facility], and she will see cardiologist in 2 

days.  She also stated she will return to ED if symptoms returns.


03/10/18 22:36


Leaving Against Medical Advice (AMA):


   This patient is choosing to leave against medical advice.  The EP has 

personally explained to the pt that choosing to do so may result in permanent 

bodily harm or death.  The EP discussed at great length that without further 

evaluation and monitoring there may be unforeseen circumstances and/or 

deterioration causing permanent bodily harm or death as a result of their 

choice.  The pt verbalized these risks back to the physician in laymans terms.

  The pt is alert, oriented, and shows the mental capacity to make clear 

decisions regarding the pts health care at this time. The pt continues to wish 

to leave against medical advice.  





   In light of the pts decision to leave AMA, follow-up has been recommended 

and the pt is aware of the importance of following up as instructed.  The pt 

has been advised that they should return to the ED immediately if they change 

their mind at any time, or if thier condition begins to change or worsen in any 

way.





Re-evaluation Time: 22:37


Reassessment Condition: Re-examined, Improved





- Lab Interpretations


Lab Results: 








 03/10/18 21:50 





 03/10/18 21:50 





 Lab Results





03/10/18 21:50: Sodium 141, Potassium 3.8, Chloride 104, Carbon Dioxide 30, 

Anion Gap 11, BUN 16, Creatinine 0.9, Est GFR (African Amer) > 60, Est GFR (Non-

Af Amer) > 60, Random Glucose 92, Calcium 9.6, Magnesium 2.2, Total Bilirubin 

0.5, AST 35, ALT 32, Alkaline Phosphatase 50, Lactate Dehydrogenase 447, Total 

Creatine Kinase 91, Troponin I < 0.01, Total Protein 7.6, Albumin 4.3, Globulin 

3.3, Albumin/Globulin Ratio 1.3


03/10/18 21:50: WBC 10.3  D, RBC 4.18, Hgb 12.7, Hct 38.0, MCV 90.9, MCH 30.4, 

MCHC 33.4, RDW 12.7, Plt Count 202, MPV 11.4 H, Gran % 74.1 H, Lymph % (Auto) 

17.8 L, Mono % (Auto) 5.9, Eos % (Auto) 1.9, Baso % (Auto) 0.3, Gran # 7.60 H, 

Lymph # (Auto) 1.8, Mono # (Auto) 0.6, Eos # (Auto) 0.2, Baso # (Auto) 0.03








I have reviewed the lab results: Yes


Interpretation: No clinic. lab abnormalty





- RAD Interpretation


Narrative RAD Interpretations (Text): 





03/10/18 22:37


Chest x-rays:  NAD   


Radiology Orders: 








03/10/18 21:10


CHEST PORTABLE [RAD] Stat 





03/10/18 21:11


HEAD W/O CONTRAST [CT] Stat 














- EKG Interpretation


Interpreted by ED Physician: Yes (NSR @ 81 bpm.  No ST changes)


Type: 12 lead EKG


Comparison: No previous EKG avail.





- Medication Orders


Current Medication Orders: 











Discontinued Medications





Aspirin (Aspirin)  325 mg PO STAT STA


   Stop: 03/10/18 21:10


   Last Admin: 03/10/18 21:56  Dose: 325 mg











Disposition/Present on Arrival





- Present on Arrival


Any Indicators Present on Arrival: No


History of DVT/PE: No


History of Uncontrolled Diabetes: No


Urinary Catheter: No


History of Decub. Ulcer: No


History Surgical Site Infection Following: None





- Disposition


Have Diagnosis and Disposition been Completed?: Yes


Diagnosis: 


 Chest pain





Disposition: AGAINST MEDICAL ADVICE


Disposition Time: 22:45


Condition: UNKNOWN


Discharge Instructions (ExitCare):  Chest Pain (ED)


Additional Instructions: 


Return to emergency if symptoms returns.


Referrals: 


PCP,NO [Primary Care Provider] - Follow up with primary


Forms:  [employer] (English)

## 2018-03-10 NOTE — CT
EXAM:

  CT Head Without Intravenous Contrast



CLINICAL HISTORY:

  38 years old, female; Signs and symptoms; Dizziness



TECHNIQUE:

  Axial computed tomography images of the head/brain without intravenous 

contrast.  All CT scans at this facility use one or more dose reduction 

techniques, viz.: automated exposure control; ma/kV adjustment per patient size 

(including targeted exams where dose is matched to indication; i.e. head); or 

iterative reconstruction technique.

  Coronal and sagittal reformatted images were created and reviewed.



COMPARISON:

  CT - HEAD W/O CONTRAST 2017-06-27 23:16



FINDINGS:

  Brain:  No intracranial hemorrhage.  No mass.  No definite edema.

  Ventricles:  No hydrocephalus.

  Bones/joints:  No acute fracture.

  Soft tissues:  Unremarkable.

  Sinuses:  No acute sinusitis.

  Mastoid air cells:  No mastoid effusion.

  Orbits:  Unremarkable as visualized.



IMPRESSION:     

1.  No definite acute intracranial abnormality.

## 2018-03-11 NOTE — CARD
--------------- APPROVED REPORT --------------





EKG Measurement

Heart [id number]

[id number]

[id number]

[id number]

[id number]



<Conclusion>

*** Poor data quality, interpretation may be adversely affected

Normal sinus rhythm

Normal ECG

## 2018-03-11 NOTE — RAD
HISTORY:

Chest pain



COMPARISON:

08/27/2017 



FINDINGS:



LUNGS:

No active pulmonary disease.



PLEURA:

No significant pleural effusion identified, no pneumothorax apparent.



CARDIOVASCULAR:

Normal.



OSSEOUS STRUCTURES:

No significant abnormalities.



VISUALIZED UPPER ABDOMEN:

Normal.



OTHER FINDINGS:

None.



IMPRESSION:

No active disease. No significant interval change compared to the 

prior examination(s).



___________________________________________________________



Concordant results with the preliminary interpretation rendered by 

the emergency department physician

procedure.